# Patient Record
Sex: MALE | URBAN - METROPOLITAN AREA
[De-identification: names, ages, dates, MRNs, and addresses within clinical notes are randomized per-mention and may not be internally consistent; named-entity substitution may affect disease eponyms.]

---

## 2022-03-21 ENCOUNTER — APPOINTMENT (OUTPATIENT)
Age: 67
Setting detail: DERMATOLOGY
End: 2022-03-22

## 2022-03-21 DIAGNOSIS — L30.9 DERMATITIS, UNSPECIFIED: ICD-10-CM

## 2022-03-21 PROCEDURE — 99203 OFFICE O/P NEW LOW 30 MIN: CPT

## 2022-03-21 PROCEDURE — OTHER COUNSELING: PREDNISONE: OTHER

## 2022-03-21 PROCEDURE — OTHER TREATMENT REGIMEN: OTHER

## 2022-03-21 PROCEDURE — OTHER PRESCRIPTION: OTHER

## 2022-03-21 PROCEDURE — OTHER FOLLOW UP FOR NEXT VISIT: OTHER

## 2022-03-21 PROCEDURE — OTHER COUNSELING: OTHER

## 2022-03-21 RX ORDER — PREDNISONE 10 MG/1
10MG TABLET ORAL QAM
Qty: 38 | Refills: 0 | Status: ERX | COMMUNITY
Start: 2022-03-21

## 2022-03-21 ASSESSMENT — SEVERITY ASSESSMENT: SEVERITY: MILD TO MODERATE

## 2022-03-21 ASSESSMENT — PAIN INTENSITY VAS: HOW INTENSE IS YOUR PAIN 0 BEING NO PAIN, 10 BEING THE MOST SEVERE PAIN POSSIBLE?: NO PAIN

## 2022-03-21 ASSESSMENT — LOCATION DETAILED DESCRIPTION DERM: LOCATION DETAILED: RIGHT SUPERIOR MEDIAL UPPER BACK

## 2022-03-21 ASSESSMENT — LOCATION SIMPLE DESCRIPTION DERM: LOCATION SIMPLE: RIGHT UPPER BACK

## 2022-03-21 ASSESSMENT — ITCH NUMERIC RATING SCALE: HOW SEVERE IS YOUR ITCHING?: 2

## 2022-03-21 ASSESSMENT — BSA RASH: BSA RASH: 10

## 2022-03-21 ASSESSMENT — LOCATION ZONE DERM: LOCATION ZONE: TRUNK

## 2022-03-21 NOTE — HPI: RASH
What Type Of Note Output Would You Prefer (Optional)?: Bullet Format
Is The Patient Presenting As Previously Scheduled?: Yes
How Severe Is Your Rash?: moderate
Is This A New Presentation, Or A Follow-Up?: Rash
Additional History: Patient has a rash on his back that he first noticed a month ago. He states that the rash is red and itchy. He has used a topical Benadryl gel that did not provide relief.

## 2022-03-21 NOTE — PROCEDURE: TREATMENT REGIMEN
Otc Regimen: Gentle cleanser and moisturizer
Plan: This appears consistent with some form of spongiotic, allergic or irritant dermatitis. Patient will take the oral prednisone as directed. He will call or follow up if this does not improve or if it continues to reoccur for further diagnostic testing. Discussed topical options, but due to location we will start prednisone. Take prednisone taper as directed. Follow up if no improvement.
Initiate Treatment: Prednisone taper
Detail Level: Zone

## 2022-03-21 NOTE — PROCEDURE: FOLLOW UP FOR NEXT VISIT
Instructions (Optional): If no improvement
Scheduled For Follow Up In (Optional): 1 month
Detail Level: Zone